# Patient Record
Sex: MALE | Employment: UNEMPLOYED | URBAN - METROPOLITAN AREA
[De-identification: names, ages, dates, MRNs, and addresses within clinical notes are randomized per-mention and may not be internally consistent; named-entity substitution may affect disease eponyms.]

---

## 2022-01-14 ENCOUNTER — HOSPITAL ENCOUNTER (EMERGENCY)
Age: 52
Discharge: HOME | End: 2022-01-15
Attending: EMERGENCY MEDICINE

## 2022-01-14 VITALS
HEART RATE: 99 BPM | OXYGEN SATURATION: 94 % | SYSTOLIC BLOOD PRESSURE: 108 MMHG | DIASTOLIC BLOOD PRESSURE: 41 MMHG | TEMPERATURE: 90.5 F

## 2022-01-14 NOTE — PROGRESS NOTES
responded to [employer] for  Levar Lechuga, who is a 47 y. o.,male. The  provided the following Interventions:  Was available to provide crisis pastoral care and pastoral support. Offered prayers for the patient as staff attended to him. Asked ER staff to contact [id number] [address] Mendes if family arrived and desired support. Chart reviewed. The following outcomes were achieved:  Patient received prayer. Assessment:  There are no spiritual or Muslim issues which require intervention at this time. Plan:  Chaplains will continue to follow and will provide pastoral care on an as needed/requested basis.  recommends bedside caregivers page  on duty if patient or family members show signs of acute spiritual or emotional distress.        [address]   [phone]

## 2022-01-14 NOTE — ED NOTES
Pt arrived via EMS from home, founhd by home health aids  Pt was apneic and pulseless upon EMS arrival  12 rounds of CPR performed, pulse felt  49/30 BP    IO in right leg